# Patient Record
Sex: MALE | Race: AMERICAN INDIAN OR ALASKA NATIVE | NOT HISPANIC OR LATINO | Employment: FULL TIME | URBAN - METROPOLITAN AREA
[De-identification: names, ages, dates, MRNs, and addresses within clinical notes are randomized per-mention and may not be internally consistent; named-entity substitution may affect disease eponyms.]

---

## 2023-06-30 ENCOUNTER — APPOINTMENT (OUTPATIENT)
Dept: LAB | Facility: LAB | Age: 53
End: 2023-06-30
Payer: COMMERCIAL

## 2023-06-30 LAB
ALANINE AMINOTRANSFERASE (SGPT) (U/L) IN SER/PLAS: 23 U/L (ref 10–52)
ALBUMIN (G/DL) IN SER/PLAS: 4.9 G/DL (ref 3.4–5)
ALKALINE PHOSPHATASE (U/L) IN SER/PLAS: 49 U/L (ref 33–120)
ANION GAP IN SER/PLAS: 12 MMOL/L (ref 10–20)
ASPARTATE AMINOTRANSFERASE (SGOT) (U/L) IN SER/PLAS: 42 U/L (ref 9–39)
BILIRUBIN TOTAL (MG/DL) IN SER/PLAS: 1.1 MG/DL (ref 0–1.2)
C REACTIVE PROTEIN (MG/L) IN SER/PLAS BY HIGH SENSIT: 0.5 MG/L
CALCIDIOL (25 OH VITAMIN D3) (NG/ML) IN SER/PLAS: 35 NG/ML
CALCIUM (MG/DL) IN SER/PLAS: 9.8 MG/DL (ref 8.6–10.6)
CARBON DIOXIDE, TOTAL (MMOL/L) IN SER/PLAS: 29 MMOL/L (ref 21–32)
CHLORIDE (MMOL/L) IN SER/PLAS: 102 MMOL/L (ref 98–107)
COBALAMIN (VITAMIN B12) (PG/ML) IN SER/PLAS: 338 PG/ML (ref 211–911)
CREATININE (MG/DL) IN SER/PLAS: 0.76 MG/DL (ref 0.5–1.3)
GFR MALE: >90 ML/MIN/1.73M2
GLUCOSE (MG/DL) IN SER/PLAS: 96 MG/DL (ref 74–99)
HIV 1/ 2 AG/AB SCREEN: NONREACTIVE
POTASSIUM (MMOL/L) IN SER/PLAS: 4.5 MMOL/L (ref 3.5–5.3)
PROSTATE SPECIFIC AG (NG/ML) IN SER/PLAS: 0.49 NG/ML (ref 0–4)
PROTEIN TOTAL: 7.4 G/DL (ref 6.4–8.2)
SODIUM (MMOL/L) IN SER/PLAS: 138 MMOL/L (ref 136–145)
THYROTROPIN (MIU/L) IN SER/PLAS BY DETECTION LIMIT <= 0.05 MIU/L: 1.39 MIU/L (ref 0.44–3.98)
UREA NITROGEN (MG/DL) IN SER/PLAS: 9 MG/DL (ref 6–23)

## 2023-07-03 LAB
CHOLESTEROL, TOTAL(NMR): 123 MG/DL (ref 100–199)
HDL-C(NMR): 56 MG/DL
HDL-P (TOTAL)(NMR): 33.1 UMOL/L
LDL AND HDL PARTICLES -DATA CONVERSION: NORMAL
LDL SIZE(NMR): 20.8 NM
LDL-C (NIH CALC)(NMR): 52 MG/DL (ref 0–99)
LDL-P(NMR): 675 NMOL/L
LP-IR SCORE(NMR): 33
SMALL LDL-P(NMR): 219 NMOL/L
TRIGLYCERIDES(NMR): 76 MG/DL (ref 0–149)

## 2024-05-07 ENCOUNTER — OFFICE VISIT (OUTPATIENT)
Dept: PRIMARY CARE | Facility: CLINIC | Age: 54
End: 2024-05-07
Payer: COMMERCIAL

## 2024-05-07 ENCOUNTER — LAB (OUTPATIENT)
Dept: LAB | Facility: LAB | Age: 54
End: 2024-05-07
Payer: COMMERCIAL

## 2024-05-07 VITALS
SYSTOLIC BLOOD PRESSURE: 136 MMHG | DIASTOLIC BLOOD PRESSURE: 81 MMHG | HEART RATE: 47 BPM | WEIGHT: 165 LBS | BODY MASS INDEX: 25.09 KG/M2

## 2024-05-07 DIAGNOSIS — R63.4 WEIGHT LOSS: ICD-10-CM

## 2024-05-07 DIAGNOSIS — I47.10 SVT (SUPRAVENTRICULAR TACHYCARDIA) (CMS-HCC): Primary | ICD-10-CM

## 2024-05-07 DIAGNOSIS — E55.9 VITAMIN D DEFICIENCY: ICD-10-CM

## 2024-05-07 DIAGNOSIS — R35.1 NOCTURIA: ICD-10-CM

## 2024-05-07 DIAGNOSIS — I47.10 SVT (SUPRAVENTRICULAR TACHYCARDIA) (CMS-HCC): ICD-10-CM

## 2024-05-07 DIAGNOSIS — I77.810 ASCENDING AORTA DILATION (CMS-HCC): ICD-10-CM

## 2024-05-07 DIAGNOSIS — Z00.00 ANNUAL PHYSICAL EXAM: ICD-10-CM

## 2024-05-07 DIAGNOSIS — E78.5 HYPERLIPIDEMIA, UNSPECIFIED HYPERLIPIDEMIA TYPE: ICD-10-CM

## 2024-05-07 LAB
25(OH)D3 SERPL-MCNC: 19 NG/ML (ref 30–100)
ALBUMIN SERPL BCP-MCNC: 4.9 G/DL (ref 3.4–5)
ALP SERPL-CCNC: 45 U/L (ref 33–120)
ALT SERPL W P-5'-P-CCNC: 22 U/L (ref 10–52)
ANION GAP SERPL CALC-SCNC: 16 MMOL/L (ref 10–20)
AST SERPL W P-5'-P-CCNC: 41 U/L (ref 9–39)
BILIRUB SERPL-MCNC: 1.2 MG/DL (ref 0–1.2)
BUN SERPL-MCNC: 11 MG/DL (ref 6–23)
CALCIUM SERPL-MCNC: 9.8 MG/DL (ref 8.6–10.6)
CHLORIDE SERPL-SCNC: 99 MMOL/L (ref 98–107)
CHOLEST SERPL-MCNC: 130 MG/DL (ref 0–199)
CHOLESTEROL/HDL RATIO: 2.4
CO2 SERPL-SCNC: 28 MMOL/L (ref 21–32)
CREAT SERPL-MCNC: 0.77 MG/DL (ref 0.5–1.3)
EGFRCR SERPLBLD CKD-EPI 2021: >90 ML/MIN/1.73M*2
ERYTHROCYTE [DISTWIDTH] IN BLOOD BY AUTOMATED COUNT: 12.3 % (ref 11.5–14.5)
GLUCOSE SERPL-MCNC: 88 MG/DL (ref 74–99)
HCT VFR BLD AUTO: 48.5 % (ref 41–52)
HDLC SERPL-MCNC: 54.5 MG/DL
HGB BLD-MCNC: 16.2 G/DL (ref 13.5–17.5)
HIV 1+2 AB+HIV1 P24 AG SERPL QL IA: NONREACTIVE
LDLC SERPL CALC-MCNC: 61 MG/DL
MCH RBC QN AUTO: 30.2 PG (ref 26–34)
MCHC RBC AUTO-ENTMCNC: 33.4 G/DL (ref 32–36)
MCV RBC AUTO: 90 FL (ref 80–100)
NON HDL CHOLESTEROL: 76 MG/DL (ref 0–149)
NRBC BLD-RTO: 0 /100 WBCS (ref 0–0)
PLATELET # BLD AUTO: 268 X10*3/UL (ref 150–450)
POTASSIUM SERPL-SCNC: 4.5 MMOL/L (ref 3.5–5.3)
PROT SERPL-MCNC: 7.5 G/DL (ref 6.4–8.2)
PSA SERPL-MCNC: 0.67 NG/ML
RBC # BLD AUTO: 5.37 X10*6/UL (ref 4.5–5.9)
SODIUM SERPL-SCNC: 138 MMOL/L (ref 136–145)
TRIGL SERPL-MCNC: 71 MG/DL (ref 0–149)
TSH SERPL-ACNC: 1.46 MIU/L (ref 0.44–3.98)
VLDL: 14 MG/DL (ref 0–40)
WBC # BLD AUTO: 5.6 X10*3/UL (ref 4.4–11.3)

## 2024-05-07 PROCEDURE — 80061 LIPID PANEL: CPT

## 2024-05-07 PROCEDURE — 99396 PREV VISIT EST AGE 40-64: CPT | Performed by: INTERNAL MEDICINE

## 2024-05-07 PROCEDURE — 36415 COLL VENOUS BLD VENIPUNCTURE: CPT

## 2024-05-07 PROCEDURE — 84443 ASSAY THYROID STIM HORMONE: CPT

## 2024-05-07 PROCEDURE — 82306 VITAMIN D 25 HYDROXY: CPT

## 2024-05-07 PROCEDURE — 87389 HIV-1 AG W/HIV-1&-2 AB AG IA: CPT

## 2024-05-07 PROCEDURE — 85027 COMPLETE CBC AUTOMATED: CPT

## 2024-05-07 PROCEDURE — 84153 ASSAY OF PSA TOTAL: CPT

## 2024-05-07 PROCEDURE — 80053 COMPREHEN METABOLIC PANEL: CPT

## 2024-05-07 RX ORDER — METOPROLOL SUCCINATE 25 MG/1
25 TABLET, EXTENDED RELEASE ORAL DAILY
COMMUNITY
End: 2024-05-07 | Stop reason: SDUPTHER

## 2024-05-07 RX ORDER — CHOLECALCIFEROL (VITAMIN D3) 50 MCG
TABLET ORAL
COMMUNITY

## 2024-05-07 RX ORDER — ROSUVASTATIN CALCIUM 10 MG/1
10 TABLET, COATED ORAL DAILY
COMMUNITY
End: 2024-05-07 | Stop reason: SDUPTHER

## 2024-05-07 RX ORDER — METOPROLOL SUCCINATE 25 MG/1
25 TABLET, EXTENDED RELEASE ORAL DAILY
Qty: 90 TABLET | Refills: 3 | Status: SHIPPED | OUTPATIENT
Start: 2024-05-07 | End: 2025-05-07

## 2024-05-07 RX ORDER — ROSUVASTATIN CALCIUM 10 MG/1
10 TABLET, COATED ORAL DAILY
Qty: 90 TABLET | Refills: 3 | Status: SHIPPED | OUTPATIENT
Start: 2024-05-07 | End: 2025-05-07

## 2024-05-07 NOTE — PROGRESS NOTES
"Subjective   Patient ID:   [id number]   [id number]   Vernon Rodríguez \"Sridhar\" is a 53 y.o. male who presents for No chief complaint on file..  HPI    53 year old male here for annual exam.  He is going on vacation tomorrow in [place] and he and his partner hope to move there eventually.  He loves the metoprolol but still feels that his PVCs mimic his anxiety and are limiting him in terms of lifestyle as he tries to avoid getting his heart rate up.  His work goes on.  He has started to become a landlord.  He does not like to drive and notes he gets lost in the dark.  He has different visual acuity in both eyes.  Sees the eye doctor regularly.  His partner remains healthy and his titers are negative.  He has been following the [place] diet most of the time and he notes he is hungry often.  He no longer does yoga as he gets dizzy in certain positions.  His cousins whose dad  of colon cancer have many polyps on their screening colonoscopy.  When he sits in the car too long gets very stiff.      ROS were reviewed and are negative with the exception of what is noted in HPI    There were no vitals taken for this visit.  Objective   Physical Exam  Constitutional:       General: He is not in acute distress.  HENT:      Head: Normocephalic and atraumatic.      Right Ear: Tympanic membrane normal.      Left Ear: Tympanic membrane normal.      Mouth/Throat:      Mouth: Mucous membranes are moist.   Eyes:      Extraocular Movements: Extraocular movements intact.      Pupils: Pupils are equal, round, and reactive to light.   Cardiovascular:      Rate and Rhythm: Normal rate and regular rhythm.      Heart sounds: No murmur heard.     No friction rub. No gallop.   Pulmonary:      Effort: Pulmonary effort is normal.      Breath sounds: No wheezing, rhonchi or rales.   Abdominal:      Palpations: Abdomen is soft.      Tenderness: There is no abdominal tenderness. There is no guarding.   Musculoskeletal:         General: No " swelling.      Cervical back: Neck supple.   Lymphadenopathy:      Cervical: No cervical adenopathy.   Neurological:      Mental Status: He is alert.         Assessment/Plan   Problem List Items Addressed This Visit    None       Provider Impressions     1. Cardiac - +CAC, DLD and SVT - Doing okay on metoprolol. Maintained his weight loss and continues with [place] diet allowing himself a rare cheat date otherwise his weight starts to drop too much. On rosuvastatin and lipids excellent. Appreciate input from cardiology regarding his significant coronary calcium, thoracic ascending aneurysm of 4.3 cm diameter and arrhythmia. His stress ECHO was negative 2021. MRA showed aneurysm stable at 4.1 cm 8/23 and reordered.   2. Thoracic ascending aneurysm - MRA 2/22 showed aneurysm stable at 4.1 cm and 8/23 stable as well. Continue to repeat every year or so serial tests to watch size. MRA ordered today.   3. Dizziness - suspect BPV. Has improved but still present occasionally. Uses canalith repositioning PRN and notes if waits 5 seconds when gets up from sitting or supine symptoms seem to car. If escalation or symptoms worsen have discussed next step of ENT referral, vestibular PT but he feels unnecessary at this time.   4. Hypertension - amazing efforts at liefstyle changes.  He is holding steady on minimal dose of metoprolol. Room to increase or add ARB if should start to go back up   5. GERD - Absent provided his weight is below 173. TUMS PRN worked in the past.   6. Diverticulosis - no symptoms  7. Hiatal hernia - asymptomatic  8. Family history of colon cancer - colonoscopy 2016 with hyperplastic, sessile polyp. Repeat 2021 and had tubular adenoma. Repeat planned for 2026.   9. Left ear itchiness, erythema - he thinks everything itches over the past few years.   10. Fatty liver - LFTs normal last few years and suspect it has improved with his dramatic weight loss. Unclear timeline to normal.   11. Partner HIV+  -PArtner is undetectable. HE prefers off PREP but being cautious otherwise. HIV testing today. Has had negative hep C in the past.  12. Snoring, enlarged uvula - snoring less of an issue since weight loss although his uvula is still on the generous side. Much less notable since lost weight.  13. Vitamin D - to continue supplements more regularly as slipped down again   14. Health maintenance   - up to date on immunizations. Got mpox vaccine in November, a 4th COVID vaccine and pneumonia vaccine   - Not interested in PREP at present as tried and intolerable side effects.   - monogamous positive undetectable HIV in partner.   - Healthy lifestyle initiatives appreciated.   - Family history of colon cancer, colonoscopy 2021 and repeat 2026   -labs today    Niki Tafoya MD

## 2024-05-07 NOTE — PATIENT INSTRUCTIONS
1.Great to see you today  2. Labs ordered  3. MRA of the aorta ordered to check the size and see that it is not bigger not until the fall.    4. You look FABULOUS. Your weight is stable. Your heart and lungs sound great.   5. Thanks for being so careful about diet and staying active.   6. Ask your cousins if they had genetic testing for MCMAHAN syndrome.        7.  Article by Pavel Widler in NYT A Year on Ozempic Taught Me;  We're Thinking About Obesity  All Wrong.         8.  Co-Intelligence by Bhupinder Shepherd is an interesting book on AI       9.  See you next year unless you need me sooner.

## 2024-07-24 ENCOUNTER — HOSPITAL ENCOUNTER (OUTPATIENT)
Dept: CARDIOLOGY | Facility: CLINIC | Age: 54
Discharge: HOME | End: 2024-07-24
Payer: COMMERCIAL

## 2024-07-24 ENCOUNTER — OFFICE VISIT (OUTPATIENT)
Dept: CARDIOLOGY | Facility: CLINIC | Age: 54
End: 2024-07-24
Payer: COMMERCIAL

## 2024-07-24 VITALS
BODY MASS INDEX: 25.63 KG/M2 | HEIGHT: 68 IN | OXYGEN SATURATION: 97 % | WEIGHT: 169.1 LBS | HEART RATE: 54 BPM | SYSTOLIC BLOOD PRESSURE: 133 MMHG | DIASTOLIC BLOOD PRESSURE: 91 MMHG

## 2024-07-24 DIAGNOSIS — I49.3 PVC (PREMATURE VENTRICULAR CONTRACTION): Primary | ICD-10-CM

## 2024-07-24 DIAGNOSIS — I47.10 SVT (SUPRAVENTRICULAR TACHYCARDIA) (CMS-HCC): ICD-10-CM

## 2024-07-24 DIAGNOSIS — I25.84 CORONARY ARTERY DISEASE DUE TO CALCIFIED CORONARY LESION: ICD-10-CM

## 2024-07-24 DIAGNOSIS — I49.3 PVC (PREMATURE VENTRICULAR CONTRACTION): ICD-10-CM

## 2024-07-24 DIAGNOSIS — I47.0 RE-ENTRY VENTRICULAR ARRHYTHMIA (MULTI): ICD-10-CM

## 2024-07-24 DIAGNOSIS — I25.10 CORONARY ARTERY DISEASE DUE TO CALCIFIED CORONARY LESION: ICD-10-CM

## 2024-07-24 LAB
ATRIAL RATE: 45 BPM
BODY SURFACE AREA: 1.92 M2
P AXIS: 63 DEGREES
P OFFSET: 180 MS
P ONSET: 120 MS
PR INTERVAL: 196 MS
Q ONSET: 218 MS
QRS COUNT: 8 BEATS
QRS DURATION: 94 MS
QT INTERVAL: 434 MS
QTC CALCULATION(BAZETT): 375 MS
QTC FREDERICIA: 394 MS
R AXIS: 72 DEGREES
T AXIS: 51 DEGREES
T OFFSET: 435 MS
VENTRICULAR RATE: 45 BPM

## 2024-07-24 PROCEDURE — 93005 ELECTROCARDIOGRAM TRACING: CPT | Performed by: INTERNAL MEDICINE

## 2024-07-24 PROCEDURE — 3008F BODY MASS INDEX DOCD: CPT | Performed by: INTERNAL MEDICINE

## 2024-07-24 PROCEDURE — 99214 OFFICE O/P EST MOD 30 MIN: CPT | Performed by: INTERNAL MEDICINE

## 2024-07-24 PROCEDURE — 1036F TOBACCO NON-USER: CPT | Performed by: INTERNAL MEDICINE

## 2024-07-24 PROCEDURE — 93246 EXT ECG>7D<15D RECORDING: CPT

## 2024-07-24 RX ORDER — MELATONIN 3 MG
3 CAPSULE ORAL NIGHTLY
COMMUNITY

## 2024-07-24 ASSESSMENT — COLUMBIA-SUICIDE SEVERITY RATING SCALE - C-SSRS
6. HAVE YOU EVER DONE ANYTHING, STARTED TO DO ANYTHING, OR PREPARED TO DO ANYTHING TO END YOUR LIFE?: NO
2. HAVE YOU ACTUALLY HAD ANY THOUGHTS OF KILLING YOURSELF?: NO
1. IN THE PAST MONTH, HAVE YOU WISHED YOU WERE DEAD OR WISHED YOU COULD GO TO SLEEP AND NOT WAKE UP?: NO

## 2024-07-24 ASSESSMENT — PAIN SCALES - GENERAL: PAINLEVEL: 0-NO PAIN

## 2024-07-24 ASSESSMENT — PATIENT HEALTH QUESTIONNAIRE - PHQ9
SUM OF ALL RESPONSES TO PHQ9 QUESTIONS 1 AND 2: 0
2. FEELING DOWN, DEPRESSED OR HOPELESS: NOT AT ALL
1. LITTLE INTEREST OR PLEASURE IN DOING THINGS: NOT AT ALL

## 2024-07-24 NOTE — PROGRESS NOTES
"Referred by Dr. Tafoya, Niki RUDOLPH MD  for    Chief Complaint   Patient presents with    PVCs      Patient referred by Dr. Tafoya for evaluation of premature ventricular contractions. Patient provides update that he has had longstanding palpitations/premature ventricular contractions which have become more pronounced although now \"subdued\" on Metoprolol. He denies lightheadedness, syncope, dyspnea, orthopnea and chest pain/discomfort.  ROSALIND Bay RN         History Of Present Illness:    Vernon Rodríguez is a 53 y.o. year old male patient with symptomatic palpitations - PVCs  being referred for evaluation and treatment.    About 10 yrs ago he began experiencing skipped  beats.  He is aware of every beat. He feels the strong heart beat.  In December 2021 they increased in frequency. He went to ER for evaluation.  He had cardiac testing incl CT calcium score, stress testing (stress-echo), and although CAD was identified it was not obstructive. He was started on metoprolol and this helped significantly.       No trigger - they come on at rest or with activity.   Borderline HTN for many years -     He does report one syncopal episode 15 yrs ago assoc with food poisoning.  Denies any other symptoms .    Past Medical History:  Past Medical History:   Diagnosis Date    Decreased libido 06/15/2016    Decreased libido without sexual dysfunction    Elevation of levels of liver transaminase levels 07/02/2019    Elevated transaminase level    Encounter for general adult medical examination without abnormal findings 06/29/2021    Blood tests for routine general physical examination    Encounter for immunization     Encounter for vaccination    Encounter for screening for malignant neoplasm of colon 10/06/2021    Colon cancer screening    Inadequate sleep hygiene 06/12/2018    History of difficulty sleeping    Personal history of other diseases of the circulatory system 06/16/2020    History of hypertension    Personal history " "of other diseases of the digestive system 06/12/2018    History of esophageal reflux    Personal history of other specified conditions 02/24/2022    History of chest pain       Past Surgical History:  Past Surgical History:   Procedure Laterality Date    MR CHEST ANGIO WO IV CONTRAST  2/11/2022    MR CHEST ANGIO WO IV [place] [facility] [place]    MR CHEST ANGIO WO IV CONTRAST  8/29/2023    MR CHEST ANGIO WO IV CONTRAST 8/29/2023 [facility] MRI          Social History:  Caffeine:  none  Cigarettes: none  ETOH: 3 gin/ 2x week  Drugs: none  Exercise: walk every day - 3 miles  Occ: FTE -  in plant  Marital status:  M ( )     Family History:  Paternal GF dec age < 40 MI  Parents: no heart issues  1/2 sisters - no issues  Children - none     Allergies:  No Known Allergies     Outpatient Medications:  Current Outpatient Medications   Medication Instructions    cholecalciferol (Vitamin D-3) 50 MCG (2000 UT) tablet oral    melatonin 3 mg, oral, Nightly    metoprolol succinate XL (TOPROL-XL) 25 mg, oral, Daily    mv-min/folic/K1/lycopen/lutein (MEN 50 PLUS MULTIVITAMIN ORAL) oral    rosuvastatin (CRESTOR) 10 mg, oral, Daily         Last Recorded Vitals:      12/14/2021     9:40 AM 2/22/2022    11:04 AM 3/29/2022     8:42 AM 7/5/2022     8:07 AM 6/30/2023     7:56 AM 5/7/2024     8:37 AM 7/24/2024     8:39 AM   Vitals   Systolic 135  143 130 118 136 133   Diastolic 91  89 80 80 81 91   Heart Rate 60  55 50 61 47 54   Height (in) 1.727 m (5' 8\")  1.727 m (5' 8\") 1.727 m (5' 8\") 1.727 m (5' 8\")  1.727 m (5' 8\")   Weight (lb) 178 168 169 173.4 164 165 169.1   BMI 27.06 kg/m2 25.54 kg/m2 25.7 kg/m2 26.37 kg/m2 24.94 kg/m2 25.09 kg/m2 25.71 kg/m2   BSA (m2) 1.97 m2 1.91 m2 1.92 m2 1.94 m2 1.89 m2 1.89 m2 1.92 m2   Visit Report      Report Report        Physical Exam:  Physical Exam  Constitutional:       Appearance: Normal appearance. He is normal weight.   HENT:      Head: Normocephalic.   Neck:      " Vascular: No carotid bruit.   Cardiovascular:      Rate and Rhythm: Normal rate and regular rhythm.      Chest Wall: PMI is not displaced.      Pulses: Normal pulses.      Heart sounds: Normal heart sounds, S1 normal and S2 normal. No murmur heard.     No gallop. No S3 or S4 sounds.   Pulmonary:      Effort: Pulmonary effort is normal.      Breath sounds: Normal breath sounds. No wheezing, rhonchi or rales.   Musculoskeletal:      Right lower leg: No edema.      Left lower leg: No edema.   Skin:     General: Skin is warm and dry.   Neurological:      Mental Status: He is alert and oriented to person, place, and time.   Psychiatric:         Mood and Affect: Mood normal.         Judgment: Judgment normal.           Last Cardiology Tests:  ECG:  Marked sinus bradycardia  45 bpm  otherwise normal        Stress Test:          Cardiac Imagin/2021    Narrative & Impression   MRN: [id number]  Patient Name: JEY MCGRAW     STUDY:  CT CARDIAC SCORING;  2021 8:19 am     INDICATION:  50 year old male with increased lipids, family hx of CAD,  help to  stratify risk.     COMPARISON:  None.     ACCESSION NUMBER(S):  [id number]     ORDERING CLINICIAN:  FAZAL YANG     TECHNIQUE:  Using prospective ECG gating, CT scan of the coronary arteries was  performed without intravenous contrast. Coronary calcium scoring  was  performed according to the method of [place].     FINDINGS:  The score and distribution of calcium in the coronary arteries is as  follows:     LM 0,  .64,  LCx 0,  .72,     Total 598.36     The visualized mid/lower ascending thoracic aorta is dilated and  measures 4.3 cm in diameter. The heart is normal in size. No  pericardial effusion is present.     No gross evidence of mediastinal or hilar lymphadenopathy or masses  is identified. The visualized segments of the lungs are normally  expanded. There is a 3.9 mm calcified granuloma in the right lower  lobe.     The visualized  subdiaphragmatic structures appear intact.Small hiatal  hernia.     IMPRESSION:  1. Coronary artery calcium score of 598*.  2. Aneurysmal ascending aorta (max 4.3 cm). Recommend CTA or MRA for  definitive assessment.          Lab review: I have Chemistry CMP:   Lab Results   Component Value Date    ALBUMIN 4.9 05/07/2024    CALCIUM 9.8 05/07/2024    CO2 28 05/07/2024    CREATININE 0.77 05/07/2024    GLUCOSE 88 05/07/2024    BILITOT 1.2 05/07/2024    PROT 7.5 05/07/2024    ALT 22 05/07/2024    AST 41 (H) 05/07/2024    ALKPHOS 45 05/07/2024   , Chemistry BMP   Lab Results   Component Value Date    GLUCOSE 88 05/07/2024    CALCIUM 9.8 05/07/2024    CO2 28 05/07/2024    CREATININE 0.77 05/07/2024   , and CBC:  Lab Results   Component Value Date    WBC 5.6 05/07/2024    RBC 5.37 05/07/2024    HGB 16.2 05/07/2024    HCT 48.5 05/07/2024    MCV 90 05/07/2024    MCH 30.2 05/07/2024    MCHC 33.4 05/07/2024    RDW 12.3 05/07/2024    NRBC 0.0 05/07/2024     TSH normal 5/2024    Assessment/Plan     Symptomatic PVCs.  Plan for assessment of LVEF and valvular function/structure.  He will wear a monitor to assess burden.     Cont metoprolol for now.        Maggy Antonio MD

## 2024-07-24 NOTE — PATIENT INSTRUCTIONS
It  nice to meet you today!    You were seen for evaluation and treatment of premature ventricular complexes or PVCs.    We discussed that you would wear the heart monitor for 14 days.  After 24 hours it can go in the shower. Please push the button when  you have your typical symptoms. In 14 days remove the monitor and return to a UPS drop off.  We will then call you with results and next steps in about 7-10 days after return.  You should do all of your normal activities while you are wearing the monitor.   Please call if any questions [phone].     Please also schedule the echocardiogram - which should be done after the monitor is completed.    We can then decide based on PVC burden and cardiac function/ valve  structure if anything additional is indicated.      Keep up your active and healthy lifestyle!

## 2024-08-15 LAB
ATRIAL RATE: 45 BPM
P AXIS: 63 DEGREES
P OFFSET: 180 MS
P ONSET: 120 MS
PR INTERVAL: 196 MS
Q ONSET: 218 MS
QRS COUNT: 8 BEATS
QRS DURATION: 94 MS
QT INTERVAL: 434 MS
QTC CALCULATION(BAZETT): 375 MS
QTC FREDERICIA: 394 MS
R AXIS: 72 DEGREES
T AXIS: 51 DEGREES
T OFFSET: 435 MS
VENTRICULAR RATE: 45 BPM

## 2024-08-21 LAB — BODY SURFACE AREA: 1.92 M2

## 2024-08-27 ENCOUNTER — APPOINTMENT (OUTPATIENT)
Dept: CARDIOLOGY | Facility: CLINIC | Age: 54
End: 2024-08-27
Payer: COMMERCIAL

## 2024-09-03 ENCOUNTER — TELEPHONE (OUTPATIENT)
Dept: CARDIOLOGY | Facility: CLINIC | Age: 54
End: 2024-09-03
Payer: COMMERCIAL

## 2024-09-03 NOTE — TELEPHONE ENCOUNTER
Unable to reach patient by phone. Left message on patient identified voice mail informing him that I an sending him some follow up information to his MyChart. Provided him with my phone to and encouraged him to call me with any questions or concerns.

## 2024-09-11 DIAGNOSIS — I49.3 FREQUENT PVCS: Primary | ICD-10-CM

## 2024-09-11 DIAGNOSIS — I25.10 CORONARY ARTERY DISEASE DUE TO CALCIFIED CORONARY LESION: ICD-10-CM

## 2024-09-11 DIAGNOSIS — I25.84 CORONARY ARTERY DISEASE DUE TO CALCIFIED CORONARY LESION: ICD-10-CM

## 2024-09-11 DIAGNOSIS — R00.2 PALPITATIONS: ICD-10-CM

## 2024-09-12 ENCOUNTER — HOSPITAL ENCOUNTER (OUTPATIENT)
Dept: RADIOLOGY | Facility: HOSPITAL | Age: 54
Discharge: HOME | End: 2024-09-12
Payer: COMMERCIAL

## 2024-09-12 DIAGNOSIS — I77.810 ASCENDING AORTA DILATION (CMS-HCC): ICD-10-CM

## 2024-09-12 PROCEDURE — 71555 MRI ANGIO CHEST W OR W/O DYE: CPT

## 2024-10-14 ENCOUNTER — APPOINTMENT (OUTPATIENT)
Dept: CARDIOLOGY | Facility: CLINIC | Age: 54
End: 2024-10-14
Payer: COMMERCIAL

## 2025-04-07 ENCOUNTER — OFFICE VISIT (OUTPATIENT)
Dept: URGENT CARE | Age: 55
End: 2025-04-07
Payer: COMMERCIAL

## 2025-04-07 VITALS
BODY MASS INDEX: 25.76 KG/M2 | RESPIRATION RATE: 16 BRPM | TEMPERATURE: 97.4 F | HEIGHT: 68 IN | DIASTOLIC BLOOD PRESSURE: 85 MMHG | HEART RATE: 75 BPM | WEIGHT: 170 LBS | OXYGEN SATURATION: 97 % | SYSTOLIC BLOOD PRESSURE: 138 MMHG

## 2025-04-07 DIAGNOSIS — N30.01 ACUTE CYSTITIS WITH HEMATURIA: Primary | ICD-10-CM

## 2025-04-07 LAB
POC APPEARANCE, URINE: CLEAR
POC BILIRUBIN, URINE: NEGATIVE
POC BLOOD, URINE: ABNORMAL
POC COLOR, URINE: YELLOW
POC GLUCOSE, URINE: NEGATIVE MG/DL
POC KETONES, URINE: NEGATIVE MG/DL
POC LEUKOCYTES, URINE: ABNORMAL
POC NITRITE,URINE: NEGATIVE
POC PH, URINE: 6 PH
POC PROTEIN, URINE: NEGATIVE MG/DL
POC SPECIFIC GRAVITY, URINE: 1.02

## 2025-04-07 PROCEDURE — 81003 URINALYSIS AUTO W/O SCOPE: CPT | Performed by: PHYSICIAN ASSISTANT

## 2025-04-07 PROCEDURE — 1036F TOBACCO NON-USER: CPT | Performed by: PHYSICIAN ASSISTANT

## 2025-04-07 PROCEDURE — 99204 OFFICE O/P NEW MOD 45 MIN: CPT | Performed by: PHYSICIAN ASSISTANT

## 2025-04-07 PROCEDURE — 3008F BODY MASS INDEX DOCD: CPT | Performed by: PHYSICIAN ASSISTANT

## 2025-04-07 RX ORDER — SULFAMETHOXAZOLE AND TRIMETHOPRIM 800; 160 MG/1; MG/1
1 TABLET ORAL 2 TIMES DAILY
Qty: 14 TABLET | Refills: 0 | Status: SHIPPED | OUTPATIENT
Start: 2025-04-07

## 2025-04-07 ASSESSMENT — PATIENT HEALTH QUESTIONNAIRE - PHQ9
2. FEELING DOWN, DEPRESSED OR HOPELESS: NOT AT ALL
1. LITTLE INTEREST OR PLEASURE IN DOING THINGS: NOT AT ALL
SUM OF ALL RESPONSES TO PHQ9 QUESTIONS 1 AND 2: 0

## 2025-04-07 NOTE — PATIENT INSTRUCTIONS
Increase fluid intake  Do not hold your urine  Avoid scented products   If there is new onset of testicular pain follow up in the ED.

## 2025-04-07 NOTE — PROGRESS NOTES
"Subjective   Patient ID: Vernon Rodríguez \"Ozzie" is a 54 y.o. male. They present today with a chief complaint of Bladder Problem (Possible bladder infection. Painful urination, urgency and blood x 1 week).    History of Present Illness  HPI    54-year-old patient presents to clinic with complaints of dysuria with associated urinary urgency, hematuria ongoing for 1 week.  Reports is sexually active with 1 partner.  Reports no possibility of STI/STD.  Denies fevers, chills, nausea, vomiting, pelvic pain, testicular pain, penile discharge, flank pain.  Past Medical History  Allergies as of 04/07/2025    (No Known Allergies)       (Not in a hospital admission)       Past Medical History:   Diagnosis Date    Decreased libido 06/15/2016    Decreased libido without sexual dysfunction    Elevation of levels of liver transaminase levels 07/02/2019    Elevated transaminase level    Encounter for general adult medical examination without abnormal findings 06/29/2021    Blood tests for routine general physical examination    Encounter for immunization     Encounter for vaccination    Encounter for screening for malignant neoplasm of colon 10/06/2021    Colon cancer screening    Inadequate sleep hygiene 06/12/2018    History of difficulty sleeping    Personal history of other diseases of the circulatory system 06/16/2020    History of hypertension    Personal history of other diseases of the digestive system 06/12/2018    History of esophageal reflux    Personal history of other specified conditions 02/24/2022    History of chest pain       Past Surgical History:   Procedure Laterality Date    MR CHEST ANGIO WO IV CONTRAST  2/11/2022    [place] CHEST ANGIO [place] [facility] [place]    MR CHEST ANGIO WO IV CONTRAST  8/29/2023    MR CHEST ANGIO WO IV CONTRAST 8/29/2023 [facility] MRI        reports that he quit smoking about 24 years ago. His smoking use included cigarettes. He has never used smokeless tobacco. He " "reports current alcohol use of about 5.0 standard drinks of alcohol per week. He reports that he does not use drugs.    Review of Systems  Review of Systems     ROS negative with the exception as noted on HPI                            Objective    Vitals:    04/07/25 1653 04/07/25 1712   BP: (!) 142/93 138/85   Pulse: 75    Resp: 16    Temp: 36.3 °C (97.4 °F)    TempSrc: Oral    SpO2: 97%    Weight: 77.1 kg (170 lb)    Height: 1.727 m (5' 8\")      No LMP for male patient.    Physical Exam  Constitutional:       General: He is not in acute distress.     Appearance: Normal appearance.   HENT:      Head: Normocephalic and atraumatic.   Cardiovascular:      Rate and Rhythm: Normal rate and regular rhythm.      Heart sounds: Normal heart sounds. No murmur heard.  Pulmonary:      Effort: Pulmonary effort is normal. No respiratory distress.      Breath sounds: No stridor. No wheezing, rhonchi or rales.   Abdominal:      General: Bowel sounds are normal. There is no distension.      Palpations: Abdomen is soft.      Tenderness: There is no abdominal tenderness. There is no right CVA tenderness, left CVA tenderness or guarding.   Neurological:      Mental Status: He is alert.         Procedures    Point of Care Test & Imaging Results from this visit  Results for orders placed or performed in visit on 04/07/25   POCT UA Automated manually resulted   Result Value Ref Range    POC Color, Urine Yellow Straw, Yellow, Light-Yellow    POC Appearance, Urine Clear Clear    POC Glucose, Urine NEGATIVE NEGATIVE mg/dl    POC Bilirubin, Urine NEGATIVE NEGATIVE    POC Ketones, Urine NEGATIVE NEGATIVE mg/dl    POC Specific Gravity, Urine 1.020 1.005 - 1.035    POC Blood, Urine MODERATE (2+) (A) NEGATIVE    POC PH, Urine 6.0 No Reference Range Established PH    POC Protein, Urine NEGATIVE NEGATIVE mg/dl    Poc Nitrite, Urine NEGATIVE NEGATIVE    POC Leukocytes, Urine MODERATE (2+) (A) NEGATIVE      Imaging  No results found.    Cardiology, " Vascular, and Other Imaging  No other imaging results found for the past 2 days      Diagnostic study results (if any) were reviewed by [facility].    Assessment/Plan   Allergies, medications, history, and pertinent labs/EKGs/Imaging reviewed by Jacquelin Zuleta PA-C.   dysuria with associated urinary urgency, hematuria ongoing for 1 week. Pt's urine is sent for C/S. Antibiotic is started and depending on the results of the lab report, we may have to change the antibiotics. In the event of high fever, pain, worsening of symptoms, nausea/vomiting pt should seek urgent/emergent medical treatment. Risk, benefits, and potential side effects of medication(s) discussed with pt. Discussed disease/illness presentation, treatment options, progression, complications, and outcomes with patient. Pt. Has expressed understanding and is an agreement of plan of care.    Medical Decision Making      Orders and Diagnoses  Diagnoses and all orders for this visit:  Acute cystitis with hematuria  -     POCT UA Automated manually resulted  -     sulfamethoxazole-trimethoprim (Bactrim DS) 800-160 mg tablet; Take 1 tablet by mouth 2 times a day.      Medical Admin Record      Patient disposition: Home    Electronically signed by [facility]  5:54 PM

## 2025-05-02 DIAGNOSIS — E78.5 HYPERLIPIDEMIA, UNSPECIFIED HYPERLIPIDEMIA TYPE: ICD-10-CM

## 2025-05-02 RX ORDER — ROSUVASTATIN CALCIUM 10 MG/1
10 TABLET, COATED ORAL DAILY
Qty: 90 TABLET | Refills: 3 | OUTPATIENT
Start: 2025-05-02 | End: 2026-05-02

## 2025-05-20 ENCOUNTER — APPOINTMENT (OUTPATIENT)
Dept: PRIMARY CARE | Facility: CLINIC | Age: 55
End: 2025-05-20
Payer: COMMERCIAL

## 2025-05-20 VITALS
HEART RATE: 65 BPM | SYSTOLIC BLOOD PRESSURE: 122 MMHG | WEIGHT: 171 LBS | BODY MASS INDEX: 25.91 KG/M2 | HEIGHT: 68 IN | DIASTOLIC BLOOD PRESSURE: 85 MMHG

## 2025-05-20 DIAGNOSIS — I10 HYPERTENSION, UNSPECIFIED TYPE: ICD-10-CM

## 2025-05-20 DIAGNOSIS — L29.9 EAR ITCH: ICD-10-CM

## 2025-05-20 DIAGNOSIS — E78.5 HYPERLIPIDEMIA, UNSPECIFIED HYPERLIPIDEMIA TYPE: ICD-10-CM

## 2025-05-20 DIAGNOSIS — Z80.0 FAMILY HISTORY OF COLON CANCER: ICD-10-CM

## 2025-05-20 DIAGNOSIS — I47.10 SVT (SUPRAVENTRICULAR TACHYCARDIA): ICD-10-CM

## 2025-05-20 DIAGNOSIS — Z12.11 COLON CANCER SCREENING: ICD-10-CM

## 2025-05-20 DIAGNOSIS — E55.9 VITAMIN D DEFICIENCY: ICD-10-CM

## 2025-05-20 DIAGNOSIS — G47.00 INSOMNIA, UNSPECIFIED TYPE: Primary | ICD-10-CM

## 2025-05-20 DIAGNOSIS — N39.0 URINARY TRACT INFECTION WITHOUT HEMATURIA, SITE UNSPECIFIED: ICD-10-CM

## 2025-05-20 DIAGNOSIS — Z00.00 ANNUAL PHYSICAL EXAM: ICD-10-CM

## 2025-05-20 PROCEDURE — 3074F SYST BP LT 130 MM HG: CPT | Performed by: INTERNAL MEDICINE

## 2025-05-20 PROCEDURE — 3008F BODY MASS INDEX DOCD: CPT | Performed by: INTERNAL MEDICINE

## 2025-05-20 PROCEDURE — 99396 PREV VISIT EST AGE 40-64: CPT | Performed by: INTERNAL MEDICINE

## 2025-05-20 PROCEDURE — 3079F DIAST BP 80-89 MM HG: CPT | Performed by: INTERNAL MEDICINE

## 2025-05-20 RX ORDER — MELATONIN 3 MG
CAPSULE ORAL
Start: 2025-05-20

## 2025-05-20 RX ORDER — METOPROLOL SUCCINATE 25 MG/1
25 TABLET, EXTENDED RELEASE ORAL DAILY
Qty: 90 TABLET | Refills: 3 | Status: SHIPPED | OUTPATIENT
Start: 2025-05-20 | End: 2025-05-20

## 2025-05-20 RX ORDER — HYDROCORTISONE AND ACETIC ACID 20.75; 10.375 MG/ML; MG/ML
4 SOLUTION AURICULAR (OTIC) 4 TIMES DAILY PRN
Qty: 10 ML | Refills: 5 | Status: SHIPPED | OUTPATIENT
Start: 2025-05-20 | End: 2026-05-20

## 2025-05-20 RX ORDER — ROSUVASTATIN CALCIUM 10 MG/1
10 TABLET, COATED ORAL DAILY
Qty: 90 TABLET | Refills: 3 | Status: SHIPPED | OUTPATIENT
Start: 2025-05-20 | End: 2026-05-20

## 2025-05-20 RX ORDER — METOPROLOL SUCCINATE 25 MG/1
25 TABLET, EXTENDED RELEASE ORAL DAILY
Qty: 90 TABLET | Refills: 3 | Status: SHIPPED | OUTPATIENT
Start: 2025-05-20 | End: 2026-05-20

## 2025-05-20 RX ORDER — MULTIVIT-MINS/IRON/FOLIC/LYCOP 8-200-600
TABLET ORAL
Start: 2025-05-20

## 2025-05-20 ASSESSMENT — PAIN SCALES - GENERAL: PAINLEVEL_OUTOF10: 0-NO PAIN

## 2025-05-21 LAB
25(OH)D3+25(OH)D2 SERPL-MCNC: 20 NG/ML (ref 30–100)
ALBUMIN SERPL-MCNC: 4.9 G/DL (ref 3.6–5.1)
ALP SERPL-CCNC: 49 U/L (ref 35–144)
ALT SERPL-CCNC: 29 U/L (ref 9–46)
ANION GAP SERPL CALCULATED.4IONS-SCNC: 13 MMOL/L (CALC) (ref 7–17)
AST SERPL-CCNC: 52 U/L (ref 10–35)
BILIRUB SERPL-MCNC: 0.8 MG/DL (ref 0.2–1.2)
BUN SERPL-MCNC: 10 MG/DL (ref 7–25)
CALCIUM SERPL-MCNC: 9.5 MG/DL (ref 8.6–10.3)
CHLORIDE SERPL-SCNC: 97 MMOL/L (ref 98–110)
CHOLEST SERPL-MCNC: 123 MG/DL
CHOLEST/HDLC SERPL: 2.3 (CALC)
CO2 SERPL-SCNC: 25 MMOL/L (ref 20–32)
CREAT SERPL-MCNC: 0.79 MG/DL (ref 0.7–1.3)
EGFRCR SERPLBLD CKD-EPI 2021: 107 ML/MIN/1.73M2
EST. AVERAGE GLUCOSE BLD GHB EST-MCNC: 108 MG/DL
EST. AVERAGE GLUCOSE BLD GHB EST-SCNC: 6 MMOL/L
GLUCOSE SERPL-MCNC: 95 MG/DL (ref 65–99)
HBA1C MFR BLD: 5.4 %
HDLC SERPL-MCNC: 54 MG/DL
HIV 1+2 AB+HIV1 P24 AG SERPL QL IA: NORMAL
LDLC SERPL CALC-MCNC: 52 MG/DL (CALC)
NONHDLC SERPL-MCNC: 69 MG/DL (CALC)
POTASSIUM SERPL-SCNC: 4.1 MMOL/L (ref 3.5–5.3)
PROT SERPL-MCNC: 7.4 G/DL (ref 6.1–8.1)
PSA SERPL-MCNC: 1.01 NG/ML
SODIUM SERPL-SCNC: 135 MMOL/L (ref 135–146)
TRIGL SERPL-MCNC: 85 MG/DL

## 2026-05-21 ENCOUNTER — APPOINTMENT (OUTPATIENT)
Dept: PRIMARY CARE | Facility: CLINIC | Age: 56
End: 2026-05-21
Payer: COMMERCIAL